# Patient Record
Sex: MALE | Race: WHITE | NOT HISPANIC OR LATINO | Employment: FULL TIME | URBAN - METROPOLITAN AREA
[De-identification: names, ages, dates, MRNs, and addresses within clinical notes are randomized per-mention and may not be internally consistent; named-entity substitution may affect disease eponyms.]

---

## 2023-04-13 ENCOUNTER — APPOINTMENT (OUTPATIENT)
Dept: PRIMARY CARE | Facility: CLINIC | Age: 28
End: 2023-04-13
Payer: COMMERCIAL

## 2023-08-09 NOTE — PROGRESS NOTES
"Subjective   Patient ID: Boone Kaba is a 27 y.o. male who presents for New Patient Visit (Pt presents as a new pt to re-establish care, has not been seen since 2018) and Annual Exam (Pt presents for annual physical exam-ABN given to pt and signed, pt verbalized understanding.).    HPI     NPV - here for CPE    Patient presents today for annual physical.  Patient is doing well overall, they have no new concerns or issues.   He is fasting today, only had black coffee.    WELLNESS VISIT   TDAP: none found - reports [id number] in [place]  GARDASIL: none found  CSCOPE: N/A  PSA: N/A  HEP C SCREEN: none found  LIPID: none found    Diet: Balanced, tries to eat 2-3 servings of veggies per day.    Exercise: regularly    Alcohol use: ~2 beverages per week.    Smoking: non-smoker    Dental visits up to date.  Vision screening, unsure of last date, denies any vision complaints.    FMHX:  Parents healthy  Grandfather with brain cancer (non-smoker, possible exposure?)  Grandfather with lung cancer (smoker)  Grandmother with lung cancer  Grandmother with hx of anemia (mediterranean?)    Prostate cancer screening: Denies family history in first degree relative.  Denies hesitancy, nocturia, or urgency.     Colon cancer screening: Denies family history in first degree relative.  Denies melena, hematochezia, constipation, diarrhea, bloating, change in bowel habits.    Cardiac disorder screening: Denies family history in first degree relative.  Denies chest pain, SOB, palpitations, edema, dizziness.     Review of Systems   All other systems reviewed and are negative.    Objective   /66 (BP Location: Right arm, Patient Position: Sitting, BP Cuff Size: Small adult)   Pulse 57   Temp 36.5 °C (97.7 °F) (Temporal)   Resp 16   Ht 1.791 m (5' 10.5\")   Wt 77.2 kg (170 lb 4.8 oz)   SpO2 98%   BMI 24.09 kg/m²     Physical Exam  Vitals and nursing note reviewed.   Constitutional:       General: He is not in acute distress.     " Appearance: Normal appearance. He is not toxic-appearing.   HENT:      Head: Normocephalic and atraumatic.      Right Ear: Tympanic membrane normal.      Left Ear: Tympanic membrane normal.      Mouth/Throat:      Mouth: Mucous membranes are moist.   Eyes:      Extraocular Movements: Extraocular movements intact.      Pupils: Pupils are equal, round, and reactive to light.   Neck:      Thyroid: No thyromegaly.   Cardiovascular:      Rate and Rhythm: Normal rate and regular rhythm.      Heart sounds: No murmur heard.     No friction rub. No gallop.   Pulmonary:      Effort: Pulmonary effort is normal.      Breath sounds: Normal breath sounds. No wheezing, rhonchi or rales.   Abdominal:      General: Bowel sounds are normal. There is no distension.      Palpations: Abdomen is soft. There is no mass.      Tenderness: There is no abdominal tenderness. There is no guarding.   Musculoskeletal:      Right lower leg: No edema.      Left lower leg: No edema.   Lymphadenopathy:      Cervical: No cervical adenopathy.   Skin:     General: Skin is warm and dry.   Neurological:      General: No focal deficit present.      Mental Status: He is alert and oriented to person, place, and time.   Psychiatric:         Mood and Affect: Mood normal.         Behavior: Behavior normal.       Assessment/Plan   Problem List Items Addressed This Visit       Healthcare maintenance     Vaccines and screenings reviewed.  Questionnaires completed.  Health and wellness topics reviewed.  Diet and exercise recommendations revisited.  Routine blood work ordered today.    LIFESTYLE MEASURES  -make sure you are avoiding refined carbs such as breads, pasta, cereal, candy, soda,  nutrition bars, granola, chips, and sugar sweetened beverages.      -eat 5- 7 servings daily of veggies,  healthy protein such as chicken, fish,  beans, and eggs, and include healthy fats in your diet such as seeds, nuts, olive oil, avocados, and salmon.   -exercise 4 - 6 days  per week as you are able, 150 minutes total weekly divided up is recommended.  -Vitamin D is recommended at 1000 - 5000 IU international units daily.   -Always wear sunscreen when you have sun exposure.  -64 oz of water is recommended daily.  -Dental visits recommended every 6 months.  -Eye exam recommended every 2 years, for those with vision problems every year.            Other Visit Diagnoses       Encounter to establish care    -  Primary    Body mass index (BMI) of 24.0 to 24.9 in adult              Follow-up in 1 year for annual physical.   Call for sooner follow-up if needed.     Scribe Attestation  By signing my name below, IMercedez Scribe   attest that this documentation has been prepared under the direction and in the presence of Analia Meyers DO.

## 2023-08-10 ENCOUNTER — LAB (OUTPATIENT)
Dept: LAB | Facility: LAB | Age: 28
End: 2023-08-10
Payer: COMMERCIAL

## 2023-08-10 ENCOUNTER — OFFICE VISIT (OUTPATIENT)
Dept: PRIMARY CARE | Facility: CLINIC | Age: 28
End: 2023-08-10
Payer: COMMERCIAL

## 2023-08-10 VITALS
RESPIRATION RATE: 16 BRPM | SYSTOLIC BLOOD PRESSURE: 127 MMHG | TEMPERATURE: 97.7 F | WEIGHT: 170.3 LBS | HEART RATE: 57 BPM | DIASTOLIC BLOOD PRESSURE: 66 MMHG | BODY MASS INDEX: 23.84 KG/M2 | OXYGEN SATURATION: 98 % | HEIGHT: 71 IN

## 2023-08-10 DIAGNOSIS — Z00.00 HEALTHCARE MAINTENANCE: ICD-10-CM

## 2023-08-10 DIAGNOSIS — Z76.89 ENCOUNTER TO ESTABLISH CARE: Primary | ICD-10-CM

## 2023-08-10 PROBLEM — S73.191D ACETABULAR LABRUM TEAR, RIGHT, SUBSEQUENT ENCOUNTER: Status: RESOLVED | Noted: 2023-08-10 | Resolved: 2023-08-10

## 2023-08-10 PROBLEM — I82.621: Status: RESOLVED | Noted: 2023-08-10 | Resolved: 2023-08-10

## 2023-08-10 PROBLEM — I82.619 CEPHALIC VEIN THROMBOSIS: Status: RESOLVED | Noted: 2023-08-10 | Resolved: 2023-08-10

## 2023-08-10 LAB
ALANINE AMINOTRANSFERASE (SGPT) (U/L) IN SER/PLAS: 18 U/L (ref 10–52)
ALBUMIN (G/DL) IN SER/PLAS: 4.9 G/DL (ref 3.4–5)
ALKALINE PHOSPHATASE (U/L) IN SER/PLAS: 53 U/L (ref 33–120)
ANION GAP IN SER/PLAS: 14 MMOL/L (ref 10–20)
ASPARTATE AMINOTRANSFERASE (SGOT) (U/L) IN SER/PLAS: 23 U/L (ref 9–39)
BILIRUBIN TOTAL (MG/DL) IN SER/PLAS: 1.4 MG/DL (ref 0–1.2)
CALCIUM (MG/DL) IN SER/PLAS: 10.3 MG/DL (ref 8.6–10.6)
CARBON DIOXIDE, TOTAL (MMOL/L) IN SER/PLAS: 27 MMOL/L (ref 21–32)
CHLORIDE (MMOL/L) IN SER/PLAS: 104 MMOL/L (ref 98–107)
CHOLESTEROL (MG/DL) IN SER/PLAS: 110 MG/DL (ref 0–199)
CHOLESTEROL IN HDL (MG/DL) IN SER/PLAS: 46.6 MG/DL
CHOLESTEROL/HDL RATIO: 2.4
CREATININE (MG/DL) IN SER/PLAS: 1.02 MG/DL (ref 0.5–1.3)
ERYTHROCYTE DISTRIBUTION WIDTH (RATIO) BY AUTOMATED COUNT: 18.1 % (ref 11.5–14.5)
ERYTHROCYTE MEAN CORPUSCULAR HEMOGLOBIN CONCENTRATION (G/DL) BY AUTOMATED: 29 G/DL (ref 32–36)
ERYTHROCYTE MEAN CORPUSCULAR VOLUME (FL) BY AUTOMATED COUNT: 65 FL (ref 80–100)
ERYTHROCYTES (10*6/UL) IN BLOOD BY AUTOMATED COUNT: 6.26 X10E12/L (ref 4.5–5.9)
GFR MALE: >90 ML/MIN/1.73M2
GLUCOSE (MG/DL) IN SER/PLAS: 92 MG/DL (ref 74–99)
HEMATOCRIT (%) IN BLOOD BY AUTOMATED COUNT: 40.4 % (ref 41–52)
HEMOGLOBIN (G/DL) IN BLOOD: 11.7 G/DL (ref 13.5–17.5)
LDL: 51 MG/DL (ref 0–99)
LEUKOCYTES (10*3/UL) IN BLOOD BY AUTOMATED COUNT: 5.2 X10E9/L (ref 4.4–11.3)
NRBC (PER 100 WBCS) BY AUTOMATED COUNT: 0 /100 WBC (ref 0–0)
PLATELETS (10*3/UL) IN BLOOD AUTOMATED COUNT: 253 X10E9/L (ref 150–450)
POTASSIUM (MMOL/L) IN SER/PLAS: 4.7 MMOL/L (ref 3.5–5.3)
PROTEIN TOTAL: 7.6 G/DL (ref 6.4–8.2)
SODIUM (MMOL/L) IN SER/PLAS: 140 MMOL/L (ref 136–145)
TRIGLYCERIDE (MG/DL) IN SER/PLAS: 60 MG/DL (ref 0–149)
UREA NITROGEN (MG/DL) IN SER/PLAS: 14 MG/DL (ref 6–23)
VLDL: 12 MG/DL (ref 0–40)

## 2023-08-10 PROCEDURE — 36415 COLL VENOUS BLD VENIPUNCTURE: CPT

## 2023-08-10 PROCEDURE — 1036F TOBACCO NON-USER: CPT | Performed by: FAMILY MEDICINE

## 2023-08-10 PROCEDURE — 99395 PREV VISIT EST AGE 18-39: CPT | Performed by: FAMILY MEDICINE

## 2023-08-10 PROCEDURE — 80061 LIPID PANEL: CPT

## 2023-08-10 PROCEDURE — 80053 COMPREHEN METABOLIC PANEL: CPT

## 2023-08-10 PROCEDURE — 3008F BODY MASS INDEX DOCD: CPT | Performed by: FAMILY MEDICINE

## 2023-08-10 PROCEDURE — 85027 COMPLETE CBC AUTOMATED: CPT

## 2023-08-10 NOTE — ASSESSMENT & PLAN NOTE
Vaccines and screenings reviewed.  Questionnaires completed.  Health and wellness topics reviewed.  Diet and exercise recommendations revisited.  Routine blood work ordered today.    LIFESTYLE MEASURES  -make sure you are avoiding refined carbs such as breads, pasta, cereal, candy, soda,  nutrition bars, granola, chips, and sugar sweetened beverages.      -eat 5- 7 servings daily of veggies,  healthy protein such as chicken, fish,  beans, and eggs, and include healthy fats in your diet such as seeds, nuts, olive oil, avocados, and salmon.   -exercise 4 - 6 days per week as you are able, 150 minutes total weekly divided up is recommended.  -Vitamin D is recommended at 1000 - 5000 IU international units daily.   -Always wear sunscreen when you have sun exposure.  -64 oz of water is recommended daily.  -Dental visits recommended every 6 months.  -Eye exam recommended every 2 years, for those with vision problems every year.

## 2024-08-12 ENCOUNTER — APPOINTMENT (OUTPATIENT)
Dept: PRIMARY CARE | Facility: CLINIC | Age: 29
End: 2024-08-12
Payer: COMMERCIAL

## 2024-10-02 PROBLEM — L93.1 SUBACUTE CUTANEOUS LUPUS ERYTHEMATOSUS: Status: ACTIVE | Noted: 2018-07-23

## 2024-10-02 RX ORDER — HYDROXYCHLOROQUINE SULFATE 200 MG/1
TABLET, FILM COATED ORAL
COMMUNITY
Start: 2018-07-23 | End: 2024-10-03 | Stop reason: WASHOUT

## 2024-10-02 NOTE — PROGRESS NOTES
"Subjective   Patient ID: Boone Kaba is a 28 y.o. male who presents for Annual Exam (Pt presents for annual physical exam- ABN was given to pt and signed, pt verbalized understanding. Pt states no new concerns at this time.).    HPI     Patient presents today for annual physical.  Patient is doing well overall.    He does have bilateral cerumen impaction he would like irrigated today. Does feel that water gets stuck after he swims, does not swim often maybe a couple of times per year. Will be going to [place] for a wedding next week.         WELLNESS VISIT   TDAP: 10/2020  GARDASIL: none found  CSCOPE: N/A  PSA: N/A  HEP C SCREEN: none found  LIPID: 8/2023 TC/HDL ratio 2.4, HDL 46, LDL 51, Trig 60    Patient has already had his influenza vaccine.  Patient defers gardasil vaccines, will check his records and consider getting at pharmacy if not already done.    Diet: balanced  Exercise: frequent,, most days  Alcohol use: occasionally   Smoking: never smoker    Dental: utd  Vision: utd    Prostate cancer screening: n/a  Denies family history in first degree relative.  Denies hesitancy, nocturia, or urgency.     Colon cancer screening: n/a  Denies family history in first degree relative.  Denies melena, hematochezia, constipation, diarrhea, bloating, change in bowel habits.    Thyroid disorder screening:   Denies family history in first degree relative.  Denies heat or cold intolerance, diarrhea or constipation, coarsening of hair, and palpitations.     Cardiac disorder screening:   Denies family history in first degree relative.  Denies chest pain, SOB, palpitations, edema, dizziness.       Review of Systems   All other systems reviewed and are negative.      Objective   /68 (BP Location: Left arm, Patient Position: Sitting, BP Cuff Size: Adult)   Pulse (!) 49   Temp 36.5 °C (97.7 °F) (Temporal)   Ht 1.791 m (5' 10.5\")   Wt 79.3 kg (174 lb 12.8 oz)   SpO2 97%   BMI 24.73 kg/m²     Physical Exam  Vitals " and nursing note reviewed.   Constitutional:       General: He is not in acute distress.     Appearance: Normal appearance. He is not toxic-appearing.   HENT:      Head: Normocephalic and atraumatic.      Right Ear: There is impacted cerumen.      Left Ear: There is impacted cerumen.      Mouth/Throat:      Mouth: Mucous membranes are moist.   Eyes:      Extraocular Movements: Extraocular movements intact.      Pupils: Pupils are equal, round, and reactive to light.   Neck:      Thyroid: No thyromegaly.   Cardiovascular:      Rate and Rhythm: Normal rate and regular rhythm.      Heart sounds: No murmur heard.     No friction rub. No gallop.   Pulmonary:      Effort: Pulmonary effort is normal.      Breath sounds: Normal breath sounds. No wheezing, rhonchi or rales.   Abdominal:      General: Bowel sounds are normal. There is no distension.      Palpations: Abdomen is soft. There is no mass.      Tenderness: There is no abdominal tenderness. There is no guarding.   Musculoskeletal:      Right lower leg: No edema.      Left lower leg: No edema.   Lymphadenopathy:      Cervical: No cervical adenopathy.   Skin:     General: Skin is warm and dry.   Neurological:      General: No focal deficit present.      Mental Status: He is alert and oriented to person, place, and time.   Psychiatric:         Mood and Affect: Mood normal.         Behavior: Behavior normal.         Assessment/Plan   Problem List Items Addressed This Visit             ICD-10-CM    Healthcare maintenance - Primary Z00.00     Vaccines and screenings reviewed.  Questionnaires completed.  Health and wellness topics reviewed.  Diet and exercise recommendations revisited.  Routine blood work reviewed, repeat labs ordered as indicated.    VACCINES:  -Flu vaccine already done for this season  -Covid vaccine recommended, plans to get at pharmacy  -TDAP is good until 2030  -Gardasil vaccines recommended, plans to check personal records, if not done will consider  getting at pharmacy.  The Gardasil vaccine is to protect against the virus that causes penile, anal, and throat cancer is now recommended for those under the age of 45. Please call your insurance to see if this is covered. It is a 2 series vaccine when started at age 14 or younger, it 3 series when started 15 or up.    SCREENINGS:  -Screening PSA recommended starting at age 50  -Screening colonoscopy recommended starting at age 45    LIFESTYLE MEASURES  -consider increasing protein intake provided no issues with kidneys to 1 gram per 1 pound of ideal body weight per not to exceed 150 gram per day. May have to supplement with a protein powder to achieve this goal.  -make sure you are avoiding refined carbs such as breads, pasta, cereal, candy, soda,  nutrition bars, granola, chips, and sugar sweetened beverages.      -eat 5- 7 servings daily of veggies,  healthy protein such as chicken, fish,  beans, and eggs, and include healthy fats in your diet such as seeds, nuts, olive oil, avocados, and salmon.   -exercise 4 - 6 days per week as you are able, 150 minutes total weekly divided up is recommended with 3-4 of those days including resistance/strength training.  -Vitamin D is recommended at 1000 - 5000 IU international units daily.   -Always wear sunscreen when you have sun exposure.  -64 oz of water is recommended daily.  -Dental visits recommended every 6 months.  -Eye exam recommended every 2 years, for those with vision problems every year.           Bilateral impacted cerumen H61.23     Irrigated by MA in office today.    For maintenance, can squeeze the liquid from a cotton ball soaked in half warm water half peroxide (or OTC Debrox) into the ear canal in both ears up to 3 times a week. You can soak a cotton ball and let it drip in your ear, let it percolate for a moment. Then use a Q-tip to wipe off the exterior of your ear. This will help with cerumen (wax) buildup.          Relevant Orders    Ear Cerumen  Removal     Other Visit Diagnoses         Codes    Body mass index (BMI) of 24.0 to 24.9 in adult     Z68.24          Follow-up in 1 year for annual physical.   Call for sooner follow-up if needed.       Scribe Attestation  By signing my name below, IMercedez Scribe   attest that this documentation has been prepared under the direction and in the presence of Analia Meyers DO.

## 2024-10-03 ENCOUNTER — APPOINTMENT (OUTPATIENT)
Dept: PRIMARY CARE | Facility: CLINIC | Age: 29
End: 2024-10-03
Payer: COMMERCIAL

## 2024-10-03 VITALS
HEART RATE: 49 BPM | TEMPERATURE: 97.7 F | HEIGHT: 71 IN | BODY MASS INDEX: 24.47 KG/M2 | SYSTOLIC BLOOD PRESSURE: 109 MMHG | WEIGHT: 174.8 LBS | OXYGEN SATURATION: 97 % | DIASTOLIC BLOOD PRESSURE: 68 MMHG

## 2024-10-03 DIAGNOSIS — H61.23 BILATERAL IMPACTED CERUMEN: ICD-10-CM

## 2024-10-03 DIAGNOSIS — Z00.00 HEALTHCARE MAINTENANCE: Primary | ICD-10-CM

## 2024-10-03 DIAGNOSIS — D56.9 THALASSEMIA, UNSPECIFIED TYPE: ICD-10-CM

## 2024-10-03 PROBLEM — I82.609: Status: ACTIVE | Noted: 2018-04-23

## 2024-10-03 PROCEDURE — 3008F BODY MASS INDEX DOCD: CPT | Performed by: FAMILY MEDICINE

## 2024-10-03 PROCEDURE — 99395 PREV VISIT EST AGE 18-39: CPT | Performed by: FAMILY MEDICINE

## 2024-10-03 PROCEDURE — 69209 REMOVE IMPACTED EAR WAX UNI: CPT | Performed by: FAMILY MEDICINE

## 2024-10-03 PROCEDURE — 1036F TOBACCO NON-USER: CPT | Performed by: FAMILY MEDICINE

## 2024-10-03 RX ORDER — CHOLECALCIFEROL (VITAMIN D3) 50 MCG
50 TABLET ORAL DAILY
COMMUNITY

## 2024-10-03 ASSESSMENT — PROMIS GLOBAL HEALTH SCALE
EMOTIONAL_PROBLEMS: RARELY
RATE_GENERAL_HEALTH: VERY GOOD
RATE_MENTAL_HEALTH: VERY GOOD
CARRYOUT_SOCIAL_ACTIVITIES: VERY GOOD
RATE_SOCIAL_SATISFACTION: VERY GOOD
RATE_QUALITY_OF_LIFE: VERY GOOD
RATE_PHYSICAL_HEALTH: VERY GOOD
RATE_AVERAGE_PAIN: 0
CARRYOUT_PHYSICAL_ACTIVITIES: COMPLETELY

## 2024-10-03 NOTE — PROGRESS NOTES
Patient ID: Boone Kaba is a 28 y.o. male.    Ear Cerumen Removal    Date/Time: 10/3/2024 8:56 AM    Performed by: Haylee Pack MA  Authorized by: Analia Meyers DO    Consent:     Consent obtained:  Verbal    Consent given by:  Patient  Universal protocol:     Patient identity confirmed:  Verbally with patient  Procedure details:     Location:  L ear and R ear    Procedure type: irrigation      Procedure outcomes: cerumen removed    Post-procedure details:     Inspection:  Ear canal clear    Hearing quality:  Improved    Procedure completion:  Tolerated

## 2024-10-03 NOTE — ASSESSMENT & PLAN NOTE
Irrigated by MA in office today.    For maintenance, can squeeze the liquid from a cotton ball soaked in half warm water half peroxide (or OTC Debrox) into the ear canal in both ears up to 3 times a week. You can soak a cotton ball and let it drip in your ear, let it percolate for a moment. Then use a Q-tip to wipe off the exterior of your ear. This will help with cerumen (wax) buildup.    Recently saw patient for a groin abscess but he mentioned unintentional weight loss. Looks like he had a positive Cologuard in August but diagnostic colonoscopy was lost to follow up. I have ordered the diagnostic colonoscopy but I am Cc'ing the primary and staff to make sure this has been ordered correctly. I am unaware of another way to order diagnostic colonoscopies so please let me know if this order should be entered differently.  English

## 2024-10-03 NOTE — ASSESSMENT & PLAN NOTE
Vaccines and screenings reviewed.  Questionnaires completed.  Health and wellness topics reviewed.  Diet and exercise recommendations revisited.  Routine blood work reviewed, repeat labs ordered as indicated.    VACCINES:  -Flu vaccine already done for this season  -Covid vaccine recommended, plans to get at pharmacy  -TDAP is good until 2030  -Gardasil vaccines recommended, plans to check personal records, if not done will consider getting at pharmacy.  The Gardasil vaccine is to protect against the virus that causes penile, anal, and throat cancer is now recommended for those under the age of 45. Please call your insurance to see if this is covered. It is a 2 series vaccine when started at age 14 or younger, it 3 series when started 15 or up.    SCREENINGS:  -Screening PSA recommended starting at age 50  -Screening colonoscopy recommended starting at age 45    LIFESTYLE MEASURES  -consider increasing protein intake provided no issues with kidneys to 1 gram per 1 pound of ideal body weight per not to exceed 150 gram per day. May have to supplement with a protein powder to achieve this goal.  -make sure you are avoiding refined carbs such as breads, pasta, cereal, candy, soda,  nutrition bars, granola, chips, and sugar sweetened beverages.      -eat 5- 7 servings daily of veggies,  healthy protein such as chicken, fish,  beans, and eggs, and include healthy fats in your diet such as seeds, nuts, olive oil, avocados, and salmon.   -exercise 4 - 6 days per week as you are able, 150 minutes total weekly divided up is recommended with 3-4 of those days including resistance/strength training.  -Vitamin D is recommended at 1000 - 5000 IU international units daily.   -Always wear sunscreen when you have sun exposure.  -64 oz of water is recommended daily.  -Dental visits recommended every 6 months.  -Eye exam recommended every 2 years, for those with vision problems every year.

## 2025-04-03 ENCOUNTER — APPOINTMENT (OUTPATIENT)
Dept: PRIMARY CARE | Facility: CLINIC | Age: 30
End: 2025-04-03
Payer: COMMERCIAL

## 2025-04-03 VITALS
DIASTOLIC BLOOD PRESSURE: 83 MMHG | OXYGEN SATURATION: 98 % | WEIGHT: 175.3 LBS | SYSTOLIC BLOOD PRESSURE: 133 MMHG | TEMPERATURE: 97.7 F | BODY MASS INDEX: 24.8 KG/M2 | RESPIRATION RATE: 16 BRPM | HEART RATE: 55 BPM

## 2025-04-03 DIAGNOSIS — K43.9 HERNIA OF ABDOMINAL WALL: Primary | ICD-10-CM

## 2025-04-03 PROCEDURE — 99213 OFFICE O/P EST LOW 20 MIN: CPT | Performed by: FAMILY MEDICINE

## 2025-04-03 PROCEDURE — 1036F TOBACCO NON-USER: CPT | Performed by: FAMILY MEDICINE

## 2025-07-20 ENCOUNTER — TELEMEDICINE (OUTPATIENT)
Dept: PRIMARY CARE | Facility: CLINIC | Age: 30
End: 2025-07-20
Payer: COMMERCIAL

## 2025-07-20 DIAGNOSIS — J06.9 VIRAL URI WITH COUGH: Primary | ICD-10-CM

## 2025-07-20 PROCEDURE — 99213 OFFICE O/P EST LOW 20 MIN: CPT | Performed by: NURSE PRACTITIONER

## 2025-07-20 RX ORDER — BROMPHENIRAMINE MALEATE, PSEUDOEPHEDRINE HYDROCHLORIDE, AND DEXTROMETHORPHAN HYDROBROMIDE 2; 30; 10 MG/5ML; MG/5ML; MG/5ML
10 SYRUP ORAL 4 TIMES DAILY PRN
Qty: 120 ML | Refills: 0 | Status: SHIPPED | OUTPATIENT
Start: 2025-07-20 | End: 2025-07-30

## 2025-07-20 ASSESSMENT — ENCOUNTER SYMPTOMS
MYALGIAS: 1
HEADACHES: 0
SINUS PAIN: 0
SINUS PRESSURE: 0
NAUSEA: 0
SORE THROAT: 0
RHINORRHEA: 1
SHORTNESS OF BREATH: 0
WHEEZING: 0
LIGHT-HEADEDNESS: 0
BACK PAIN: 0
FEVER: 0
ACTIVITY CHANGE: 0
DIZZINESS: 0
VOMITING: 0
FATIGUE: 0
COUGH: 1
APPETITE CHANGE: 0
DIARRHEA: 0

## 2025-07-20 NOTE — PROGRESS NOTES
Subjective   Patient ID: Boone Kaba is a 29 y.o. male who presents for URI (Sx onset about 3 weeks).    Sx onset 3 weeks ago  Sx improved about 1 week ago, then returned    Sx include: Some body aches, congestion, runny nose, cough, sneezing  Denies HA, sore throat, fever, wheezing, back pain, CP, rib pain   Has not taken any OTC medications      URI   Associated symptoms include congestion, coughing, rhinorrhea and sneezing. Pertinent negatives include no chest pain, diarrhea, ear pain, headaches, nausea, sinus pain, sore throat, vomiting or wheezing.        Review of Systems   Constitutional:  Negative for activity change, appetite change, fatigue and fever.   HENT:  Positive for congestion, rhinorrhea and sneezing. Negative for ear pain, sinus pressure, sinus pain and sore throat.    Respiratory:  Positive for cough. Negative for shortness of breath and wheezing.    Cardiovascular:  Negative for chest pain.   Gastrointestinal:  Negative for diarrhea, nausea and vomiting.   Musculoskeletal:  Positive for myalgias. Negative for back pain.   Neurological:  Negative for dizziness, light-headedness and headaches.       Objective   There were no vitals taken for this visit.    Physical Exam  Constitutional:       General: He is not in acute distress.     Appearance: Normal appearance. He is not ill-appearing.      Comments: Well appearing adult male    On Demand Virtual Visit Patient Consent     An interactive audio and video telecommunication system which permits real time communications between the patient (at the originating site) and provider (at the distant site) was utilized to provide this telehealth service.   Verbal consent was requested and obtained from Boone Kaba (or parent if under 18) on this date, for a telehealth visit.   I have verbally confirmed with Boone Kaba (or parent if under 18) that they are physically located in the [facility] during this virtual visit.    I performed this  visit using realtime telehealth tools, including an audio/video OR telephone connection between the patient listed who was located [place] the [place] and myself, Deejay Limon CNP (licensed in the [facility]).  At the start of the visit, I introduced myself as Deejay Limon, Nurse practitioner and verified the patients name, , and current physical location.    If they were currently outside of the state of [place], the visit was ended and the patient was referred to alternative means for evaluation and treatment.   The patient was made aware of the limitations of the telehealth visit.  They will not be physically examined and all issues may not be appropriate for a telehealth visit.  If necessary, an in person referral will be made.       DISCLAIMER:   In preparing for this visit and writing this note, I reviewed previous electronic medical records (labs, imaging and medical charts) available.  Significant findings which helped in decision making are recorded in this encounter charting.     Pulmonary:      Effort: Pulmonary effort is normal.     Neurological:      Mental Status: He is alert and oriented to person, place, and time.         Assessment/Plan   Diagnoses and all orders for this visit:  Viral URI with cough  -     brompheniramine-pseudoeph-DM 2-30-10 mg/5 mL syrup; Take 10 mL by mouth 4 times a day as needed for congestion, cough or allergies for up to 10 days.  recommend warm liquids for sore throat, honey 1tsp three times daily may help cough, and Tylenol as needed for pain/fever.  Cool mist humidifier, vapo rubs may also help with congestion   Rest and drink plenty of fluids    Most upper respiratory infection are caused by viruses but sometimes they cause secondary bacterial infections. It can cause cough, congestion, runny nose, sore throat, and fever. You are contagious. Fever medicines can help reduce fever and pain. Virus cannot be cured by an antibiotic. The body's immune system will fight off the  virus. Upper Respiratory Illnesses usually improves in 7 - 10 days, but coughs can last for several weeks. If your symptoms worsen after 10 - 14 days you may have a bacterial infection.      Follow up with PCP or return here if symptoms persist or worsen

## 2025-10-14 ENCOUNTER — APPOINTMENT (OUTPATIENT)
Dept: PRIMARY CARE | Facility: CLINIC | Age: 30
End: 2025-10-14
Payer: COMMERCIAL